# Patient Record
Sex: FEMALE | Race: WHITE | NOT HISPANIC OR LATINO | Employment: STUDENT | URBAN - METROPOLITAN AREA
[De-identification: names, ages, dates, MRNs, and addresses within clinical notes are randomized per-mention and may not be internally consistent; named-entity substitution may affect disease eponyms.]

---

## 2024-06-19 ENCOUNTER — HOSPITAL ENCOUNTER (EMERGENCY)
Facility: CLINIC | Age: 19
Discharge: OTHER INSTITUTION | End: 2024-06-20
Attending: EMERGENCY MEDICINE | Admitting: EMERGENCY MEDICINE
Payer: COMMERCIAL

## 2024-06-19 ENCOUNTER — APPOINTMENT (OUTPATIENT)
Dept: GENERAL RADIOLOGY | Facility: CLINIC | Age: 19
End: 2024-06-19
Attending: EMERGENCY MEDICINE
Payer: COMMERCIAL

## 2024-06-19 DIAGNOSIS — R63.8 DECREASED ORAL INTAKE: ICD-10-CM

## 2024-06-19 DIAGNOSIS — R07.9 CHEST PAIN, UNSPECIFIED TYPE: ICD-10-CM

## 2024-06-19 DIAGNOSIS — R06.00 DYSPNEA, UNSPECIFIED TYPE: ICD-10-CM

## 2024-06-19 DIAGNOSIS — F41.9 SEVERE ANXIETY: ICD-10-CM

## 2024-06-19 PROBLEM — F43.10 PTSD (POST-TRAUMATIC STRESS DISORDER): Status: ACTIVE | Noted: 2024-06-19

## 2024-06-19 PROBLEM — F41.1 GAD (GENERALIZED ANXIETY DISORDER): Status: ACTIVE | Noted: 2024-06-19

## 2024-06-19 LAB
ANION GAP SERPL CALCULATED.3IONS-SCNC: 16 MMOL/L (ref 7–15)
BASOPHILS # BLD AUTO: 0.1 10E3/UL (ref 0–0.2)
BASOPHILS NFR BLD AUTO: 1 %
BUN SERPL-MCNC: 11.8 MG/DL (ref 6–20)
CALCIUM SERPL-MCNC: 10.2 MG/DL (ref 8.6–10)
CHLORIDE SERPL-SCNC: 101 MMOL/L (ref 98–107)
CREAT SERPL-MCNC: 0.86 MG/DL (ref 0.51–0.95)
D DIMER PPP FEU-MCNC: 0.33 UG/ML FEU (ref 0–0.5)
DEPRECATED HCO3 PLAS-SCNC: 20 MMOL/L (ref 22–29)
EGFRCR SERPLBLD CKD-EPI 2021: >90 ML/MIN/1.73M2
EOSINOPHIL # BLD AUTO: 0 10E3/UL (ref 0–0.7)
EOSINOPHIL NFR BLD AUTO: 0 %
ERYTHROCYTE [DISTWIDTH] IN BLOOD BY AUTOMATED COUNT: 11.9 % (ref 10–15)
GLUCOSE SERPL-MCNC: 82 MG/DL (ref 70–99)
HCG SER QL IA.RAPID: NEGATIVE
HCT VFR BLD AUTO: 47.1 % (ref 35–47)
HGB BLD-MCNC: 16 G/DL (ref 11.7–15.7)
IMM GRANULOCYTES # BLD: 0 10E3/UL
IMM GRANULOCYTES NFR BLD: 0 %
LYMPHOCYTES # BLD AUTO: 1.9 10E3/UL (ref 0.8–5.3)
LYMPHOCYTES NFR BLD AUTO: 26 %
MAGNESIUM SERPL-MCNC: 2.2 MG/DL (ref 1.7–2.3)
MCH RBC QN AUTO: 30.4 PG (ref 26.5–33)
MCHC RBC AUTO-ENTMCNC: 34 G/DL (ref 31.5–36.5)
MCV RBC AUTO: 90 FL (ref 78–100)
MONOCYTES # BLD AUTO: 0.4 10E3/UL (ref 0–1.3)
MONOCYTES NFR BLD AUTO: 5 %
NEUTROPHILS # BLD AUTO: 5 10E3/UL (ref 1.6–8.3)
NEUTROPHILS NFR BLD AUTO: 68 %
NRBC # BLD AUTO: 0 10E3/UL
NRBC BLD AUTO-RTO: 0 /100
NT-PROBNP SERPL-MCNC: <36 PG/ML (ref 0–450)
PLATELET # BLD AUTO: 275 10E3/UL (ref 150–450)
POTASSIUM SERPL-SCNC: 3.6 MMOL/L (ref 3.4–5.3)
RBC # BLD AUTO: 5.26 10E6/UL (ref 3.8–5.2)
SARS-COV-2 RNA RESP QL NAA+PROBE: NEGATIVE
SODIUM SERPL-SCNC: 137 MMOL/L (ref 135–145)
TROPONIN T SERPL HS-MCNC: <6 NG/L
TSH SERPL DL<=0.005 MIU/L-ACNC: 1.63 UIU/ML (ref 0.5–4.3)
WBC # BLD AUTO: 7.4 10E3/UL (ref 4–11)

## 2024-06-19 PROCEDURE — 85379 FIBRIN DEGRADATION QUANT: CPT | Performed by: EMERGENCY MEDICINE

## 2024-06-19 PROCEDURE — 80048 BASIC METABOLIC PNL TOTAL CA: CPT | Performed by: EMERGENCY MEDICINE

## 2024-06-19 PROCEDURE — 84703 CHORIONIC GONADOTROPIN ASSAY: CPT

## 2024-06-19 PROCEDURE — 83880 ASSAY OF NATRIURETIC PEPTIDE: CPT | Performed by: EMERGENCY MEDICINE

## 2024-06-19 PROCEDURE — 93005 ELECTROCARDIOGRAM TRACING: CPT | Mod: 76

## 2024-06-19 PROCEDURE — 84443 ASSAY THYROID STIM HORMONE: CPT | Performed by: EMERGENCY MEDICINE

## 2024-06-19 PROCEDURE — 250N000011 HC RX IP 250 OP 636: Mod: JZ | Performed by: EMERGENCY MEDICINE

## 2024-06-19 PROCEDURE — 96361 HYDRATE IV INFUSION ADD-ON: CPT

## 2024-06-19 PROCEDURE — 93005 ELECTROCARDIOGRAM TRACING: CPT

## 2024-06-19 PROCEDURE — 80307 DRUG TEST PRSMV CHEM ANLYZR: CPT | Performed by: EMERGENCY MEDICINE

## 2024-06-19 PROCEDURE — 258N000003 HC RX IP 258 OP 636: Mod: JZ | Performed by: EMERGENCY MEDICINE

## 2024-06-19 PROCEDURE — 99285 EMERGENCY DEPT VISIT HI MDM: CPT | Mod: 25

## 2024-06-19 PROCEDURE — 84484 ASSAY OF TROPONIN QUANT: CPT | Performed by: EMERGENCY MEDICINE

## 2024-06-19 PROCEDURE — 71046 X-RAY EXAM CHEST 2 VIEWS: CPT

## 2024-06-19 PROCEDURE — 83735 ASSAY OF MAGNESIUM: CPT | Performed by: EMERGENCY MEDICINE

## 2024-06-19 PROCEDURE — 87635 SARS-COV-2 COVID-19 AMP PRB: CPT | Performed by: EMERGENCY MEDICINE

## 2024-06-19 PROCEDURE — 36415 COLL VENOUS BLD VENIPUNCTURE: CPT | Performed by: EMERGENCY MEDICINE

## 2024-06-19 PROCEDURE — 85025 COMPLETE CBC W/AUTO DIFF WBC: CPT | Performed by: EMERGENCY MEDICINE

## 2024-06-19 PROCEDURE — 96374 THER/PROPH/DIAG INJ IV PUSH: CPT

## 2024-06-19 RX ORDER — LORAZEPAM 2 MG/ML
0.5 INJECTION INTRAMUSCULAR ONCE
Status: COMPLETED | OUTPATIENT
Start: 2024-06-19 | End: 2024-06-19

## 2024-06-19 RX ADMIN — LORAZEPAM 0.5 MG: 2 INJECTION INTRAMUSCULAR; INTRAVENOUS at 21:51

## 2024-06-19 RX ADMIN — SODIUM CHLORIDE 1000 ML: 9 INJECTION, SOLUTION INTRAVENOUS at 19:48

## 2024-06-19 ASSESSMENT — ACTIVITIES OF DAILY LIVING (ADL)
ADLS_ACUITY_SCORE: 35

## 2024-06-19 ASSESSMENT — COLUMBIA-SUICIDE SEVERITY RATING SCALE - C-SSRS
2. HAVE YOU ACTUALLY HAD ANY THOUGHTS OF KILLING YOURSELF IN THE PAST MONTH?: NO
1. IN THE PAST MONTH, HAVE YOU WISHED YOU WERE DEAD OR WISHED YOU COULD GO TO SLEEP AND NOT WAKE UP?: NO

## 2024-06-19 NOTE — ED TRIAGE NOTES
Pt arrives with father from clinic. She reports severe panic attacks/anxiety for the past 5 days. Hx anxiety and PTSD. Pt reports being unable to eat for last 5 days due to anxiety. She reports here face and hands feel kind of numb. Tachycardic in triage. She reports ativan helped last time.

## 2024-06-20 ENCOUNTER — TELEPHONE (OUTPATIENT)
Dept: BEHAVIORAL HEALTH | Facility: CLINIC | Age: 19
End: 2024-06-20
Payer: COMMERCIAL

## 2024-06-20 ENCOUNTER — HOSPITAL ENCOUNTER (INPATIENT)
Facility: CLINIC | Age: 19
LOS: 4 days | Discharge: HOME | End: 2024-06-24
Attending: PSYCHIATRY & NEUROLOGY | Admitting: PSYCHIATRY & NEUROLOGY
Payer: COMMERCIAL

## 2024-06-20 VITALS
SYSTOLIC BLOOD PRESSURE: 115 MMHG | DIASTOLIC BLOOD PRESSURE: 94 MMHG | WEIGHT: 97.88 LBS | HEART RATE: 121 BPM | TEMPERATURE: 98.9 F | BODY MASS INDEX: 19.22 KG/M2 | HEIGHT: 60 IN | RESPIRATION RATE: 16 BRPM | OXYGEN SATURATION: 99 %

## 2024-06-20 DIAGNOSIS — R11.0 NAUSEA: ICD-10-CM

## 2024-06-20 DIAGNOSIS — F41.1 GAD (GENERALIZED ANXIETY DISORDER): Primary | ICD-10-CM

## 2024-06-20 PROBLEM — F33.2 MAJOR DEPRESSIVE DISORDER, RECURRENT SEVERE WITHOUT PSYCHOTIC FEATURES (H): Status: ACTIVE | Noted: 2024-06-20

## 2024-06-20 LAB
AMPHETAMINES UR QL SCN: ABNORMAL
ATRIAL RATE - MUSE: 104 BPM
ATRIAL RATE - MUSE: 125 BPM
BARBITURATES UR QL SCN: ABNORMAL
BENZODIAZ UR QL SCN: ABNORMAL
BZE UR QL SCN: ABNORMAL
CANNABINOIDS UR QL SCN: ABNORMAL
DIASTOLIC BLOOD PRESSURE - MUSE: NORMAL MMHG
DIASTOLIC BLOOD PRESSURE - MUSE: NORMAL MMHG
FENTANYL UR QL: ABNORMAL
INTERPRETATION ECG - MUSE: NORMAL
INTERPRETATION ECG - MUSE: NORMAL
OPIATES UR QL SCN: ABNORMAL
P AXIS - MUSE: 80 DEGREES
P AXIS - MUSE: 80 DEGREES
PCP QUAL URINE (ROCHE): ABNORMAL
PR INTERVAL - MUSE: 112 MS
PR INTERVAL - MUSE: 116 MS
QRS DURATION - MUSE: 82 MS
QRS DURATION - MUSE: 84 MS
QT - MUSE: 328 MS
QT - MUSE: 332 MS
QTC - MUSE: 427 MS
QTC - MUSE: 436 MS
R AXIS - MUSE: 92 DEGREES
R AXIS - MUSE: 94 DEGREES
SYSTOLIC BLOOD PRESSURE - MUSE: NORMAL MMHG
SYSTOLIC BLOOD PRESSURE - MUSE: NORMAL MMHG
T AXIS - MUSE: 39 DEGREES
T AXIS - MUSE: 42 DEGREES
VENTRICULAR RATE- MUSE: 102 BPM
VENTRICULAR RATE- MUSE: 104 BPM

## 2024-06-20 PROCEDURE — 128N000002 HC R&B CD/MH ADOLESCENT

## 2024-06-20 PROCEDURE — 96376 TX/PRO/DX INJ SAME DRUG ADON: CPT

## 2024-06-20 PROCEDURE — 250N000011 HC RX IP 250 OP 636: Performed by: EMERGENCY MEDICINE

## 2024-06-20 PROCEDURE — 250N000013 HC RX MED GY IP 250 OP 250 PS 637: Performed by: PSYCHIATRY & NEUROLOGY

## 2024-06-20 PROCEDURE — 250N000013 HC RX MED GY IP 250 OP 250 PS 637: Performed by: EMERGENCY MEDICINE

## 2024-06-20 RX ORDER — OLANZAPINE 10 MG/2ML
10 INJECTION, POWDER, FOR SOLUTION INTRAMUSCULAR 3 TIMES DAILY PRN
Status: DISCONTINUED | OUTPATIENT
Start: 2024-06-20 | End: 2024-06-21

## 2024-06-20 RX ORDER — OLANZAPINE 10 MG/1
10 TABLET ORAL 3 TIMES DAILY PRN
Status: CANCELLED | OUTPATIENT
Start: 2024-06-20

## 2024-06-20 RX ORDER — MAGNESIUM HYDROXIDE/ALUMINUM HYDROXICE/SIMETHICONE 120; 1200; 1200 MG/30ML; MG/30ML; MG/30ML
30 SUSPENSION ORAL EVERY 4 HOURS PRN
Status: CANCELLED | OUTPATIENT
Start: 2024-06-20

## 2024-06-20 RX ORDER — HYDROXYZINE HYDROCHLORIDE 25 MG/1
25 TABLET, FILM COATED ORAL EVERY 4 HOURS PRN
Status: CANCELLED | OUTPATIENT
Start: 2024-06-20

## 2024-06-20 RX ORDER — HYDROXYZINE HCL 10 MG/5 ML
25-50 SOLUTION, ORAL ORAL ONCE
Status: COMPLETED | OUTPATIENT
Start: 2024-06-20 | End: 2024-06-20

## 2024-06-20 RX ORDER — HYDROXYZINE HYDROCHLORIDE 25 MG/1
25 TABLET, FILM COATED ORAL EVERY 4 HOURS PRN
Status: DISCONTINUED | OUTPATIENT
Start: 2024-06-20 | End: 2024-06-21

## 2024-06-20 RX ORDER — ONDANSETRON 4 MG/1
4 TABLET, ORALLY DISINTEGRATING ORAL EVERY 6 HOURS PRN
Status: CANCELLED | OUTPATIENT
Start: 2024-06-20

## 2024-06-20 RX ORDER — LOPERAMIDE HCL 2 MG
2 CAPSULE ORAL 4 TIMES DAILY PRN
Status: CANCELLED | OUTPATIENT
Start: 2024-06-20

## 2024-06-20 RX ORDER — TRAZODONE HYDROCHLORIDE 50 MG/1
50 TABLET, FILM COATED ORAL
Status: CANCELLED | OUTPATIENT
Start: 2024-06-20

## 2024-06-20 RX ORDER — MAGNESIUM HYDROXIDE/ALUMINUM HYDROXICE/SIMETHICONE 120; 1200; 1200 MG/30ML; MG/30ML; MG/30ML
30 SUSPENSION ORAL EVERY 4 HOURS PRN
Status: DISCONTINUED | OUTPATIENT
Start: 2024-06-20 | End: 2024-06-24 | Stop reason: HOSPADM

## 2024-06-20 RX ORDER — AMOXICILLIN 250 MG
1 CAPSULE ORAL 2 TIMES DAILY PRN
Status: CANCELLED | OUTPATIENT
Start: 2024-06-20

## 2024-06-20 RX ORDER — TRAZODONE HYDROCHLORIDE 50 MG/1
50 TABLET, FILM COATED ORAL
Status: DISCONTINUED | OUTPATIENT
Start: 2024-06-20 | End: 2024-06-24 | Stop reason: HOSPADM

## 2024-06-20 RX ORDER — OLANZAPINE 10 MG/2ML
10 INJECTION, POWDER, FOR SOLUTION INTRAMUSCULAR 3 TIMES DAILY PRN
Status: CANCELLED | OUTPATIENT
Start: 2024-06-20

## 2024-06-20 RX ORDER — CLONIDINE HYDROCHLORIDE 0.1 MG/1
0.1 TABLET ORAL EVERY 6 HOURS PRN
Status: CANCELLED | OUTPATIENT
Start: 2024-06-20

## 2024-06-20 RX ORDER — ACETAMINOPHEN 325 MG/1
650 TABLET ORAL EVERY 4 HOURS PRN
Status: DISCONTINUED | OUTPATIENT
Start: 2024-06-20 | End: 2024-06-24 | Stop reason: HOSPADM

## 2024-06-20 RX ORDER — ACETAMINOPHEN 325 MG/1
650 TABLET ORAL EVERY 4 HOURS PRN
Status: CANCELLED | OUTPATIENT
Start: 2024-06-20

## 2024-06-20 RX ORDER — LORAZEPAM 2 MG/ML
0.5 INJECTION INTRAMUSCULAR ONCE
Status: COMPLETED | OUTPATIENT
Start: 2024-06-20 | End: 2024-06-20

## 2024-06-20 RX ORDER — AMOXICILLIN 250 MG
1 CAPSULE ORAL 2 TIMES DAILY PRN
Status: DISCONTINUED | OUTPATIENT
Start: 2024-06-20 | End: 2024-06-24 | Stop reason: HOSPADM

## 2024-06-20 RX ORDER — OLANZAPINE 10 MG/1
10 TABLET ORAL 3 TIMES DAILY PRN
Status: DISCONTINUED | OUTPATIENT
Start: 2024-06-20 | End: 2024-06-21

## 2024-06-20 RX ADMIN — HYDROXYZINE HYDROCHLORIDE 25 MG: 25 TABLET, FILM COATED ORAL at 20:22

## 2024-06-20 RX ADMIN — LORAZEPAM 0.5 MG: 2 INJECTION INTRAMUSCULAR; INTRAVENOUS at 00:14

## 2024-06-20 RX ADMIN — HYDROXYZINE HYDROCHLORIDE 50 MG: 10 SYRUP ORAL at 13:52

## 2024-06-20 ASSESSMENT — ACTIVITIES OF DAILY LIVING (ADL)
ADLS_ACUITY_SCORE: 28
ADLS_ACUITY_SCORE: 35
ADLS_ACUITY_SCORE: 28
ADLS_ACUITY_SCORE: 35
ADLS_ACUITY_SCORE: 28
ADLS_ACUITY_SCORE: 35
ADLS_ACUITY_SCORE: 28
ADLS_ACUITY_SCORE: 35
ADLS_ACUITY_SCORE: 35

## 2024-06-20 NOTE — ED NOTES
IP MH Referral Acuity Rating Score (RARS)    LMHP complete at referral to IP MH, with DEC; and, daily while awaiting IP MH placement. Call score to PPS.  CRITERIA SCORING   New 72 HH and Involuntary for IP MH (not adolescent) 0/1   Boarding over 24 hours 0/1   Vulnerable adult at least 55+ with multiple co morbidities; or, Patient age 11 or under 0/1   Suicide ideation without relief of precipitating factors 0/1   Current plan for suicide 0/1   Current plan for homicide 0/1   Imminent risk or actual attempt to seriously harm another without relief of factors precipitating the attempt 0/1   Severe dysfunction in daily living (ex: complete neglect for self care, extreme disruption in vegetative function, extreme deterioration in social interactions) 1/1   Recent (last 2 weeks) or current physical aggression in the ED 0/1   Restraints or seclusion episode in ED 0/1   Verbal aggression, agitation, yelling, etc., while in the ED 0/1   Active psychosis with psychomotor agitation or catatonia 0/1   Need for constant or near constant redirection (from leaving, from others, etc).  0/1   Intrusive or disruptive behaviors 0/1   TOTAL Acuity Total Score: 1       agree agree agree agree agree

## 2024-06-20 NOTE — TELEPHONE ENCOUNTER
2:12am - Intake called CRN to review pt for possible placement to 6A/YA unit    2:50am - CRN called back and was wondering about the IV Ativan medications given earlier and whether they were forced medications or patient's preferred way of receiving medications. Intake will call [facility] ED to gain information and call 6A with update.    3:05am - Intake called and spoke with MARY ELLEN Hgoan at [facility] and she stated pt preferred the IV of Ativan and that these medications were not given by force. Pt is medication compliant by IV only as she has high anxiety when taking pills orally in her past. Pt father at bedside also stated to RN that pt was compliant and wanted IV medications. Intake will update CRN on 6A.    3:16am - INtake updated 6A CRN about information above. CRN agrees pt meets criteria for the unit and Intake will page the provider for acceptance.     3:30am - Paged [facility] for possible placement to [address] for [facility]    3:41am - Dr. Alvarez accepts pt for [facility]    3:46am - Notified unit of pt placement. Pt will be admitting on next shift due to a discharge for the open bed.     3:53am - Notified MARY ELLEN Hogan of pt acceptance and placement. RN aware of pt waiting for discharge on unit before admitting.     Any Completed S.R    R: Patient Placement to [address]/Dr. Hogan

## 2024-06-20 NOTE — TELEPHONE ENCOUNTER
R:  [place]  Access Inpatient Bed Call Log 6/20/2024  @ 7:30 AM   Intake has called facilities that have not updated their bed status within the last 12 hours.       [facility] is posting 0 beds.               [facility] is posting 3 beds. [phone]    [place] is posting 0 beds. [phone]               [facility] is posting 0 bed. [phone]; Per call at 7:39am to [facility] no high acuity but poss a few low acuity beds.   [facility] is posting 0 beds. [phone]    [facility] (These are Young Adult beds, 18-35) is posting 2 bed. Negative COVID test required, no recent or significant aggression, violence, or sexual assault. [phone]; Per call at 7:40am to [facility], adol, and child beds are available, however, no toby beds.   [facility] is posting 0 beds. [phone]          [facility] is posting 0 beds. [phone] Per Mary @ 10:25 AM metro @ cap until tomorrow at- 8AM; regional @ cap until 11am today, call back         [facility] is posting 4 beds. Mixed unit 12+. Low acuity only. HP DIRECT: [phone]      [facility] is posting 2 bed. No aggression.  [phone]    Per Mary @ 10:25 AM metro @ cap until tomorrow at- 8AM; regional @ cap until 11am today, call back   [facility] is posting 0 beds. [phone];    [facility] is posting 0 beds. Low acuity only. [phone] Per call at 7:42am to [facility] no beds available.   [facility] is posting 0 beds. Low acuity. No current aggression. [phone]    [facility] is posting 0 beds. Low acuity. [phone]   [facility] is posting 1 bed. 72 HH preferred. Low acuity. [phone]; Per call at 7:47am to [place] beds are available.   [facility] is posting 3 beds. Low acuity. [phone]; Per call at 7:42am to [facility], 1 low acuity bed available.         Pt remains on work list pending appropriate bed availability.        10:13 AM According to [facility],  the discharge that was supposed to open private room for pt to admit to was canceled. Called 6A to confirm with MARK Marinelli. Yves states pt can remain in queue as he may have another discharge later today.   10:18 AM Called [facility] and updated on admission barriers.   11:20 AM Per call to [facility], they are reviewing for all open beds at the moment, suggested calling back later.   11:22 AM Per call to Concepcion at [facility], can fax clinical for review.  11:32 AM Clinical faxed to [facility] for review.   12:17 PM Called LIN Marinelli to confirm posted discharge. Yves state that discharge should open a room to accommodate admission. It will not be until next shift. He states [facility] can call for report at 4PM.   12:20 PM Called [facility] to update.   5:37 PM Called [facility] Marinelli for update. He states pt should be en route for admission.

## 2024-06-20 NOTE — ED PROVIDER NOTES
Over care of the patient at handoff.  Patient is waiting for inpatient placement for anxiety and PTSD.  She is voluntary but holdable.  She is agreeable to inpatient placement.  Patient is feeling a little bit anxious, she is given another dose of IV Ativan.    Patient is found placement at [facility].  Patient is awaiting transfer.     Linda Vasquez MD  06/21/24 5768

## 2024-06-20 NOTE — ED PROVIDER NOTES
Emergency Department Note      History of Present Illness     Chief Complaint  Panic Attack    HPI  Janice Cox is a 18 year old female with a history of anxiety and PTSD who presents to the emergency department with concerns for intractable anxiety with reduced oral intake, intermittent chest pain and shortness of breath.  She notes she cannot take her oral anxiety medications due to anxiety and PTSD related to bad reactions from past medications.  She notes she is still been urinating.  She denies abdominal pain.  She denies fever or chills.  She notes similar symptoms with past anxiety and panic attacks, however reports she has never had evaluation for this aside from an examination.  Her father reports she was sent here after visit from her primary care provider earlier today with regards for needing admission for inpatient psychiatric care.    She denies feeling suicidal.    Independent Historian  Father as detailed above.    Review of External Notes  I reviewed [facility] note from 6/8/2024 patient was seen for nausea with vomiting that provoked a panic attack.  Past Medical History   Medical History and Problem List  Anxiety  Panic attack  PTSD    Medications  Denies daily medications currently    Surgical History   No past surgical history on file.  Physical Exam   Patient Vitals for the past 24 hrs:   BP Temp Temp src Pulse Resp SpO2 Height Weight   06/19/24 2237 -- -- -- -- -- 96 % -- --   06/19/24 2236 -- -- -- -- -- 96 % -- --   06/19/24 2235 -- -- -- -- -- 96 % -- --   06/19/24 2234 -- -- -- -- -- 97 % -- --   06/19/24 2233 -- -- -- -- -- 97 % -- --   06/19/24 2232 -- -- -- -- -- 98 % -- --   06/19/24 2231 -- -- -- -- -- 97 % -- --   06/19/24 2230 133/85 -- -- 91 -- 97 % -- --   06/19/24 2229 -- -- -- -- -- 97 % -- --   06/19/24 2228 -- -- -- -- -- 99 % -- --   06/19/24 2220 -- -- -- -- -- 97 % -- --   06/19/24 2219 -- -- -- -- -- 98 % -- --   06/19/24 2218 -- -- -- -- -- 98 % -- --   06/19/24 2217  -- -- -- -- -- 98 % -- --   06/19/24 2216 -- -- -- -- -- 98 % -- --   06/19/24 2215 -- -- -- -- -- 98 % -- --   06/19/24 1827 (!) 122/103 98.9  F (37.2  C) Temporal (!) 121 20 100 % 1.524 m (5') 44.4 kg (97 lb 14.2 oz)     Physical Exam  General: Adult sitting upright  Eyes: PERRL, Conjunctive within normal limits.  No scleral icterus.  ENT: Moist mucous membranes, oropharynx clear.   CV: Normal S1S2, no murmur, rub or gallop.  Tachycardic, regular.  Resp: Clear to auscultation bilaterally, no wheezes, rales or rhonchi. Normal respiratory effort.  GI: Abdomen is soft, nontender and nondistended. No palpable masses. No rebound or guarding.  MSK: No edema. Nontender. Normal active range of motion.  Skin: Warm and dry. No rashes or lesions or ecchymoses on visible skin.  Neuro: Alert and oriented. Responds appropriately to all questions and commands. No focal findings appreciated. Normal muscle tone.  Psych: Rapid speech.  Anxious appearing.  Teary at times.  Diagnostics   Lab Results   Labs Ordered and Resulted from Time of ED Arrival to Time of ED Departure   BASIC METABOLIC PANEL - Abnormal       Result Value    Sodium 137      Potassium 3.6      Chloride 101      Carbon Dioxide (CO2) 20 (*)     Anion Gap 16 (*)     Urea Nitrogen 11.8      Creatinine 0.86      GFR Estimate >90      Calcium 10.2 (*)     Glucose 82     CBC WITH PLATELETS AND DIFFERENTIAL - Abnormal    WBC Count 7.4      RBC Count 5.26 (*)     Hemoglobin 16.0 (*)     Hematocrit 47.1 (*)     MCV 90      MCH 30.4      MCHC 34.0      RDW 11.9      Platelet Count 275      % Neutrophils 68      % Lymphocytes 26      % Monocytes 5      % Eosinophils 0      % Basophils 1      % Immature Granulocytes 0      NRBCs per 100 WBC 0      Absolute Neutrophils 5.0      Absolute Lymphocytes 1.9      Absolute Monocytes 0.4      Absolute Eosinophils 0.0      Absolute Basophils 0.1      Absolute Immature Granulocytes 0.0      Absolute NRBCs 0.0     TSH WITH FREE T4 REFLEX  - Normal    TSH 1.63     MAGNESIUM - Normal    Magnesium 2.2     D DIMER QUANTITATIVE - Normal    D-Dimer Quantitative 0.33     TROPONIN T, HIGH SENSITIVITY - Normal    Troponin T, High Sensitivity <6     NT PROBNP INPATIENT - Normal    N terminal Pro BNP Inpatient <36     ISTAT HCG QUALITATIVE PREGNANCY POCT - Normal    HCG Qualitative POCT Negative     COVID-19 VIRUS (CORONAVIRUS) BY PCR       Imaging  Chest XR,  PA & LAT   Final Result   IMPRESSION: No acute airspace consolidation. No pleural effusion or pneumothorax.      Cardiomediastinal silhouette is normal.          EKG     ECG results from 06/19/24   EKG 12-lead, tracing only     Value    Systolic Blood Pressure     Diastolic Blood Pressure     Ventricular Rate 102    Atrial Rate 125    OH Interval 116    QRS Duration 82        QTc 427    P Axis 80    R AXIS 94    T Axis 42    Interpretation ECG      Sinus tachycardia  Right atrial enlargement  Rightward axis  Pulmonary disease pattern  Nonspecific ST abnormality  Abnormal ECG  No previous ECGs available     EKG 12 lead     Value    Systolic Blood Pressure     Diastolic Blood Pressure     Ventricular Rate 104    Atrial Rate 104    OH Interval 112    QRS Duration 84        QTc 436    P Axis 80    R AXIS 92    T Axis 39    Interpretation ECG      Sinus tachycardia  Right atrial enlargement  Rightward axis  Pulmonary disease pattern  Nonspecific ST abnormality  Abnormal ECG  When compared with ECG of 19-JUN-2024 18:58, (unconfirmed)  No significant change was found        Independent Interpretation  None  ED Course    Medications Administered  Medications   sodium chloride 0.9% BOLUS 1,000 mL (1,000 mLs Intravenous $New Bag 6/19/24 1948)   LORazepam (ATIVAN) injection 0.5 mg (0.5 mg Intravenous $Given 6/19/24 2151)       Discussion of Management  DEC consultLaura of DEC notes that the patient at this time has severe anxiety that is intractable and would benefit from inpatient psychiatric care.    Will plan to reassess in the morning to see if reassessment offers new information.    Social Determinants of Health adding to complexity of care  None    ED Course  Past medical records, nursing notes, and vitals reviewed.  I performed an exam of the patient and obtained history, as documented above.   I reassessed the patient discussed findings of today's evaluation and recommendations.  She and her father are agreeable with this plan.    Medical Decision Making / Diagnosis     OSCAR Cox is a 18 year old female who presents with reported concerns for anxiety and panic attacks that are intractable.  She has had poor and oral intake as a result and has a fear of taking oral medications related to past bad experiences.  She is not currently suicidal.  She has some medical concerns which could be linked to anxiety however evaluation is undertaking to make organic cause less likely.  She is tachycardic on arrival.  She has EKG changes which although abnormal were similar to past ECGs at recent visits.  Troponin, D-dimer, and BNP are normal here making acute cardiopulmonary cause of her symptoms less likely.  Chest x-ray not show acute pathology.  Her labs are otherwise reassuring with slight acidosis likely secondary to hyperventilation.  DEC assessed the patient and felt she met criteria at this time for inpatient psychiatric care given the intractable nature of her anxiety and panic with difficulty controlling symptoms at home.  She is voluntary.  DEC notes they will reassess her in the morning to reassess the situation    Disposition  Care of the patient was transferred to my colleague Dr. Eaton pending reassessment in the morning by DEC with current plan for admission to inpatient psychiatric care    ICD-10 Codes:    ICD-10-CM    1. Severe anxiety  F41.9       2. Decreased oral intake  R63.8       3. Chest pain, unspecified type  R07.9       4. Dyspnea, unspecified type  R06.00            Yandy CHACON  MD Rama Ontiveros Tracy Dianne, MD  06/19/24 0344

## 2024-06-20 NOTE — ED NOTES
"Pt states she is feeling \"better and I was able to eat some crackers\".  Pt resting in bed awake.   Pt's respirations are WDL.  Brought pt more crackers.   "

## 2024-06-20 NOTE — PLAN OF CARE
Janice Cox  June 19, 2024  Plan of Care Hand-off Note     Patient Care Path: inpatient mental health    Plan for Care:   After therapeutic assessment, intervention and aftercare planning by ED care team and LM and in consultation with attending provider, the patient's circumstances and mental state were appropriate for outpatient management. It is the recommendation of this clinician that admit to inpatient psychiatric care for safety and stabilization. Patient has presented to the emergency department multiple times for her debilitating anxiety. Patient was seen by her primary care provider today for her anxiety symptoms which have reached a level where she is not able to function in the community, patient's primary care provider sent patient to the emergency room for an admission as patient is not able to access the level of services as an outpatient basis at this time. Patient is struggling to eat, sleep or function, patient is having multiple panic attacks daily for the past 5 days. Patient is anxious and fearful about taking medication, patient is unable to get herself to take medication due to fear of the medication killing her or causing her to have a brain tumor. Patient is unable to self regulate, calm down time. Once patient is able to function and is more stable this provider would recommend patient attend a day treatment program once discharged.    Identified Goals and Safety Issues: Admit to inpatient psych for safety and stabilization, once patient is able to discharge this  would recommend patient attend a day treatment program.  Patient does have a first time therapy appointment set up for July 5.    Overview:  Nabil Cox (Father)  [phone]      Legal Status: Legal Status at Admission: Voluntary/Patient has signed consent for treatment    Psychiatry Consult: LIZBET Ontiveros  regarding plan of care.           KURT SellersSW
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
## 2024-06-20 NOTE — ED NOTES
"Writer encountered pt on her way back from the bathroom. She is tachypneic and occasionally gasping, saying \"I'm scared, I feel so anxious\".  She is slouching and taking short steps.  Helped pt back to her room with standby assist and into bed.  Reassured patient and contacting MD.   "

## 2024-06-20 NOTE — ED NOTES
Spoke with [facility] about transfer to [facility].  Per [facility] the unit no longer has a bed opening until this afternoon.

## 2024-06-20 NOTE — ED NOTES
"Pt is tearful and appears anxious, stating \"I'm scared\".  Reassured pt, tried distraction techniques, and coached pt through taking liquid medication. After taking medication, pt lies back down in bed.   "

## 2024-06-20 NOTE — ED PROVIDER NOTES
This 18 year old female patient was under my care during my shift from 0600 to 1500 on 06/20/24.  She is in the emergency department awaiting psychiatric bed placement on a voluntary basis for severe anxiety. Medically cleared. I did not meet with the patient but at 4421 I was notified the patient was having increased anxiety.  She has an aversion to pills so we will give her liquid Atarax.  She is not appropriate to be getting further IV benzodiazepines and I have asked the nurse to remove her IV.  This is not medically necessary nor are IV benzodiazepines a long term treatment solution. At the end of my shift her care was transferred to my partner, Dr. Abdalla.     Sylwia Quinonez MD  06/22/24 3186

## 2024-06-21 PROCEDURE — G0177 OPPS/PHP; TRAIN & EDUC SERV: HCPCS

## 2024-06-21 PROCEDURE — 128N000002 HC R&B CD/MH ADOLESCENT

## 2024-06-21 PROCEDURE — 250N000013 HC RX MED GY IP 250 OP 250 PS 637: Performed by: PSYCHIATRY & NEUROLOGY

## 2024-06-21 PROCEDURE — 99222 1ST HOSP IP/OBS MODERATE 55: CPT | Performed by: CLINICAL NURSE SPECIALIST

## 2024-06-21 PROCEDURE — 250N000013 HC RX MED GY IP 250 OP 250 PS 637: Performed by: CLINICAL NURSE SPECIALIST

## 2024-06-21 RX ORDER — OLANZAPINE 2.5 MG/1
2.5-5 TABLET, FILM COATED ORAL 3 TIMES DAILY PRN
Status: DISCONTINUED | OUTPATIENT
Start: 2024-06-21 | End: 2024-06-24 | Stop reason: HOSPADM

## 2024-06-21 RX ORDER — ONDANSETRON 4 MG/1
4 TABLET, ORALLY DISINTEGRATING ORAL EVERY 8 HOURS PRN
Status: ON HOLD | COMMUNITY
End: 2024-06-24

## 2024-06-21 RX ORDER — ONDANSETRON 4 MG/1
4 TABLET, ORALLY DISINTEGRATING ORAL EVERY 6 HOURS PRN
Qty: 30 TABLET | Refills: 0 | Status: SHIPPED | OUTPATIENT
Start: 2024-06-21

## 2024-06-21 RX ORDER — ESCITALOPRAM OXALATE 5 MG/1
5 TABLET ORAL DAILY
Qty: 30 TABLET | Refills: 1 | Status: SHIPPED | OUTPATIENT
Start: 2024-06-22

## 2024-06-21 RX ORDER — HYDROXYZINE HYDROCHLORIDE 25 MG/1
25-50 TABLET, FILM COATED ORAL EVERY 4 HOURS PRN
Status: DISCONTINUED | OUTPATIENT
Start: 2024-06-21 | End: 2024-06-24 | Stop reason: HOSPADM

## 2024-06-21 RX ORDER — ADAPALENE GEL USP, 0.3% 3 MG/G
GEL TOPICAL
COMMUNITY

## 2024-06-21 RX ORDER — OLANZAPINE 10 MG/2ML
5 INJECTION, POWDER, FOR SOLUTION INTRAMUSCULAR 3 TIMES DAILY PRN
Status: DISCONTINUED | OUTPATIENT
Start: 2024-06-21 | End: 2024-06-24 | Stop reason: HOSPADM

## 2024-06-21 RX ORDER — LORAZEPAM 0.5 MG/1
0.5 TABLET ORAL 2 TIMES DAILY PRN
Status: DISCONTINUED | OUTPATIENT
Start: 2024-06-21 | End: 2024-06-24 | Stop reason: HOSPADM

## 2024-06-21 RX ORDER — TRIAMCINOLONE ACETONIDE 1 MG/G
CREAM TOPICAL 2 TIMES DAILY PRN
COMMUNITY

## 2024-06-21 RX ORDER — ESCITALOPRAM OXALATE 5 MG/1
5 TABLET ORAL DAILY
Status: DISCONTINUED | OUTPATIENT
Start: 2024-06-21 | End: 2024-06-24 | Stop reason: HOSPADM

## 2024-06-21 RX ORDER — ONDANSETRON 4 MG/1
4 TABLET, ORALLY DISINTEGRATING ORAL EVERY 6 HOURS PRN
Status: DISCONTINUED | OUTPATIENT
Start: 2024-06-21 | End: 2024-06-24 | Stop reason: HOSPADM

## 2024-06-21 RX ORDER — HYDROXYZINE HYDROCHLORIDE 25 MG/1
25-50 TABLET, FILM COATED ORAL EVERY 4 HOURS PRN
Qty: 30 TABLET | Refills: 0 | Status: SHIPPED | OUTPATIENT
Start: 2024-06-21

## 2024-06-21 RX ORDER — HYDROXYZINE PAMOATE 25 MG/1
25 CAPSULE ORAL 3 TIMES DAILY PRN
Status: ON HOLD | COMMUNITY
End: 2024-06-24

## 2024-06-21 RX ADMIN — ACETAMINOPHEN 650 MG: 325 TABLET ORAL at 10:07

## 2024-06-21 RX ADMIN — HYDROXYZINE HYDROCHLORIDE 50 MG: 25 TABLET ORAL at 22:23

## 2024-06-21 RX ADMIN — HYDROXYZINE HYDROCHLORIDE 50 MG: 25 TABLET ORAL at 14:18

## 2024-06-21 RX ADMIN — ESCITALOPRAM OXALATE 5 MG: 5 TABLET, FILM COATED ORAL at 12:48

## 2024-06-21 ASSESSMENT — ACTIVITIES OF DAILY LIVING (ADL)
ADLS_ACUITY_SCORE: 28

## 2024-06-21 NOTE — PLAN OF CARE
"  Rehab Group    Start time: 1015  End time: 1200  Patient time total: 60 minutes    attended partial group    #5 attended   Group Type: OT Clinic   Group Topic Covered: cognitive activities, coping skills, healthy leisure time, and problem solving   Group Session Detail: OT: Education on healthy activity engagement and creative hands-on endeavor (OT clinic) to increase concentration, focus, attention to task/detail, decision making, problem solving, frustration tolerance, task follow through, coping with stress, healthy leisure engagement, creative expression, and social engagement    Patient Response/Contribution:  cooperative with task, actively engaged, and Pleasant; Engaged; Restless   Patient Detail: Pt reported during check-in that \"taking my medicine\" is something that is going well for her recently; pt reported she has a fear of ingesting medications and was able to successfully take her medicine today. Pt sat among peers to complete OT questionnaire and engaged in reciprocal social interactions with peers. Pt pulled from group and returned for remainder. Pt engaged in a complex hands on creative endeavor for approximately 4-5+ minutes before becoming restless. Pt then became fixated on cleaning the paint out of the sink; pt engaged in task for remainder of group.      Train & Education Service Per Session 45 + Minutes () OT Group code    Patient Active Problem List   Diagnosis    OCHOA (generalized anxiety disorder)    PTSD (post-traumatic stress disorder)    Major depressive disorder, recurrent severe without psychotic features (H)         "

## 2024-06-21 NOTE — PROGRESS NOTES
"   06/20/24 2043   Patient Belongings   Patient Belongings locker   Patient Belongings Put in Hospital Secure Location (Security or Locker, etc.) cell phone/electronics;clothing;plastic bag;shoes   Belongings Search Yes   Clothing Search Yes   Second Staff Mihir     In locker: brown \"portals\" david, navy blue quarter zip, plaid pajama pants, QueaseEase aroma therapy, phone charging block and cable, iphone, snacks, water, nausea bag, tissues, pair of crocs with a duck.     Brought in 6/22/24  Blue tote bag  1 long sleeve  1 black t shirt  2 pairs of pajama pants  1 pair of leggings  1 pair of socks  4 pairs of underwear  1 brush  1 deodorant      No valuables sent to security.     .A               Admission:  I am responsible for any personal items that are not sent to the safe or pharmacy.  [facility] is not responsible for loss, theft or damage of any property in my possession.    Signature:  _________________________________ Date: _______  Time: _____                                              Staff Signature:  ____________________________ Date: ________  Time: _____      2nd Staff person, if patient is unable/unwilling to sign:    Signature: ________________________________ Date: ________  Time: _____     Discharge:  [facility] has returned all of my personal belongings:    Signature: _________________________________ Date: ________  Time: _____                                          Staff Signature:  ____________________________ Date: ________  Time: _____          "

## 2024-06-21 NOTE — PLAN OF CARE
Problem: Depressive Symptoms  Goal: Depressive Symptoms  Description: Signs and symptoms of listed problems will be absent or manageable.  Outcome: Progressing   Goal Outcome Evaluation:       Pt had no meds scheduled for 8:00. She rated anxiety 7/10 and refused interventions. She rated depression 1-2/10. She denied SI, HI, and hallucinations. She thought it was Thursday, and knew she is at [facility]. Pt mentioned that she is a picky eater and only ate the blueberry muffin of her breakfast. She used the telephone, and visible in the hallway. After 10:00pt had a 4/10 headache- given tylenol, within an hour pain 0/10.     Pt attended morning OT, a nutrition consult was placed, she went back on the phone, ate lunch, started Lexapro, and attended life skills class. After class pt claimed having a panic attack and wanted ativan. Pt given 50 mg hydroxyzine. Writer walked, talked, and sat with pt as the panic attack dissipated. She mentioned one trigger for her attacks are weekends.

## 2024-06-21 NOTE — PLAN OF CARE
"  Problem: Anxiety Signs/Symptoms  Goal: Optimized Energy Level (Anxiety Signs/Symptoms)  Outcome: Not Progressing  Problem: Adult Behavioral Health Plan of Care  Goal: Plan of Care Review  Outcome: Progressing  Goal: Optimized Coping Skills in Response to Life Stressors  Outcome: Progressing   Goal Outcome Evaluation:   Pt admitted from [facility] with dx of PTSD and anxiety. Pt has history of anxiety and panic attacks.  Legal- voluntary  Pt denies SI/SIB/hallucinations and depression. Pt endorses anxiety that she said\" it is worse, I usually experience panic attacks and that is why I am here.\"   Patient was very tearful during admission and when her dad visited.  PRN hydroxyzine was administered which was effective.  Pt contracts for safety.  Pt was given drawing papers and crayon as per her request, she spent time coloring in her room.  Pt does not like  taking  tablets because of previous bad experiences, hence she will prefer liquid medications or take one tablet at a time and wait before taking another.  Pt signed DEX for her dad.  No behavior noted. Staff will continue to monitor.    "

## 2024-06-21 NOTE — PLAN OF CARE
"Rehab Group     Start time: 1315  End time: 1415  Patient time total: 60 minutes     attended partial group     #5 attended   Group Type: occupational therapy   Group Topic Covered: cognitive activities, coping skills, and healthy leisure time   Group Session Detail: OT: Education on healthy leisure engagement and interactive social activity with a cognitive component (Tapple & Pictionary) to increase concentration, focus, attention to task/detail, memory recall, coping with stress, healthy distraction engagement, symptom management, healthy leisure engagement, social wellness, cognitive wellness, and abstract and concrete thinking    Patient Response/Contribution:  cooperative with task, actively engaged, and Pleasant; Engaged   Patient Detail: Pt reported during check-in that \"doing nails and evan\" are healthy leisure activities she enjoys and \"cheerleading and softball\" are healthy leisure activities she wants to engage in again. Pt sat among peers to complete novel social activity to support cognitive wellness and was able to follow verbal directions and visual demonstration for activity. Pt able to identify several accurate responses and provided responses to peer who was struggling to identify a response. Pt acted as both the presenter and guesser during activity. Pt reported they enjoyed the activities and verbalized understanding of importance of healthy leisure in a healthy lifestyle.       Train & Education Service Per Session 45 + Minutes () OT Group code     Patient Active Problem List   Diagnosis    OCHOA (generalized anxiety disorder)    PTSD (post-traumatic stress disorder)    Major depressive disorder, recurrent severe without psychotic features (H)         "

## 2024-06-21 NOTE — PLAN OF CARE
BEH IP Unit Acuity Rating Score (UARS)  Patient is given one point for every criteria they meet.    CRITERIA SCORING   On a 72 hour hold, court hold, committed, stay of commitment, or revocation. 0    Patient LOS on BEH unit exceeds 20 days. 0  LOS: 1   Patient under guardianship, 55+, otherwise medically complex, or under age 11. 0   Suicide ideation without relief of precipitating factors. 0   Current plan for suicide. 0   Current plan for homicide. 0   Imminent risk or actual attempt to seriously harm another without relief of factors precipitating the attempt. 0   Severe dysfunction in daily living (ex: complete neglect for self care, extreme disruption in vegetative function, extreme deterioration in social interactions). 1   Recent (last 7 days) or current physical aggression in the ED or on unit. 0   Restraints or seclusion episode in past 72 hours. 0   Recent (last 7 days) or current verbal aggression, agitation, yelling, etc., while in the ED or unit. 0   Active psychosis. 0   Need for constant or near constant redirection (from leaving, from others, etc).  0   Intrusive or disruptive behaviors. 0   Patient requires 3 or more hours of individualized nursing care per 8-hour shift (i.e. for ADLs, meds, therapeutic interventions). 0   TOTAL 1

## 2024-06-21 NOTE — PROGRESS NOTES
"CLINICAL NUTRITION SERVICES - ASSESSMENT NOTE     Nutrition Prescription    RECOMMENDATIONS FOR MDs/PROVIDERS TO ORDER:  Consider ED assessment for pt d/t significant sensory and texture issues, specific food preferences and unintentional weight loss. Pt would prefer referral for outpatient assessment if it would delay discharge.    Malnutrition Status:    Patient does not meet two of the established criteria necessary for diagnosing malnutrition but is at risk for malnutrition    Recommendations already ordered by Registered Dietitian (RD):  -Write-ins: chicken nuggets, grilled cheese, cheese pizza, mac + cheese  -String cheese @ 10am    Future/Additional Recommendations:  Monitor PO intake, preferences, wt trends.      REASON FOR ASSESSMENT  Janice Cox is a/an 18 year old female assessed by the dietitian for Provider Order - pt thinks she has ARFID, no formal dx.     CLINICAL HISTORY  PMH significant for PTSD, anxiety and panic attacks. Admitted from [facility] ED 6/20/24 due to concern for worsening anxiety and panic attacks.     NUTRITION HISTORY  RD spoke with Janice over the phone who reports that since she was 2 years old, she hasn't been able to eat \"normal\" food. Won't eat meat, applesauce, fruit or vegetables. Very particular about textures and gags if she tries to force herself to eat. Pt will starve vs forcing herself to eat. Pt typically eats the same foods everyday.  Safe foods include noodles, potatoes, corn, chicken, ketchup or buffalo sauce. Pt is agreeable to write-in options and scheduled snack, and recommendation for eating disorder assessment to rule out ARFID.     GI: Pt denied N/V/D/C     CURRENT NUTRITION ORDERS  Diet: Regular  Supplement: none  Intake/Tolerance: ate only blueberry muffin for breakfast    LABS  Reviewed    MEDICATIONS  Lexapro    ANTHROPOMETRICS  Height: 152.4 cm (5')  Most Recent Weight: 44.6 kg (98 lb 6.4 oz)    IBW: 45.5 kg   BMI: Normal BMI  Weight History:   Wt Readings " from Last 15 Encounters:   06/20/24 44.6 kg (98 lb 6.4 oz) (3%, Z= -1.96)*   06/19/24 44.4 kg (97 lb 14.2 oz) (2%, Z= -2.00)*   09/29/23 53.3 kg (117 lb 8 oz)      03/08/23 48 kg (105 lb 14.4 oz)        * Growth percentiles are based on CDC (Girls, 2-20 Years) data.   16.3% unintentional weight loss in 9 months  UBW: 105-110 lb, wt tends to fluctuate per pt    Dosing Weight: 44 kg (actual)    ASSESSED NUTRITION NEEDS  Estimated Energy Needs: 1100 - 1320 kcals/day (25 - 30 kcals/kg)  Justification: Maintenance  Estimated Protein Needs: 35 - 44 grams protein/day (0.8 - 1 grams of pro/kg)  Justification: Maintenance  Estimated Fluid Needs: 1 mL/kcal  Justification: Maintenance or Per Provider    PHYSICAL FINDINGS  See malnutrition section below.  No abnormal nutrition-related physical findings observed.     MALNUTRITION  % Intake: Decreased intake does not meet criteria  % Weight Loss: Weight loss does not meet criteria  Subcutaneous Fat Loss: Unable to assess - RD offsite  Muscle Loss: Unable to assess - RD offsite  Fluid Accumulation/Edema: Unable to assess - RD offsite  Malnutrition Diagnosis: Patient does not meet two of the established criteria necessary for diagnosing malnutrition but is at risk for malnutrition    NUTRITION DIAGNOSIS  Inadequate oral intake related to specific food preferences and sensory issues as evidenced by minimal intake since admission and 16% wt loss in 9 months.       INTERVENTIONS  Implementation  Nutrition Education: RD role in care   Collaboration with other providers  Modify composition of meals/snacks     Goals  Patient to consume % of nutritionally adequate meal trays TID, or the equivalent with supplements/snacks.     Monitoring/Evaluation  Progress toward goals will be monitored and evaluated per protocol.    Dolores Haynes, MPH, RDN, LD  Behavioral Health Adult & Pediatric Dietitian  BEH Clinical Dietitian [place], Teams, or Desk: [phone]  Weekend/Holiday Vocera: Weekend  Holiday Clinical Dietitian [Multi Site Groups]

## 2024-06-21 NOTE — PHARMACY-ADMISSION MEDICATION HISTORY
Pharmacist Admission Medication History    Admission medication history is complete. The information provided in this note is only as accurate as the sources available at the time of the update.    Information Source(s): Patient and CareEverywhere/SureScripts via phone    Pertinent Information:   Patient was very familiar with her medications.   Duloxetine filled on 5/22/24, however she reports that she stopped taking this.  Spironolactone was filled on 5/3/24 however she stopped this a couple weeks ago as she did not like the way it made her feel. Reports feeling dizzy and having some vertigo.     Changes made to PTA medication list:  Added:   Hydroxyzine 25 mg three times daily as needed  Ondansetron 4 mg every 8 hours as needed   Adapalene 0.3% external gel at bedtime as needed (patient reports only using this once a month)  Triamcinolone 0.1% cream 2 times daily as needed (patient reports only using when she has a flare)  Deleted: None  Changed: None    Allergies reviewed with patient and updates made in EHR: yes    Medication History Completed By: Luna AlbrechtD 6/21/2024 5:41 PM    Prior to Admission medications    Medication Sig Last Dose Taking? Auth Provider Long Term End Date   adapalene (DIFFERIN) 0.3 % external gel Apply topically nightly as needed Past Month Yes Unknown, Entered By History     hydrOXYzine leslie (VISTARIL) 25 MG capsule Take 25 mg by mouth 3 times daily as needed for anxiety PRN at PRN Yes Unknown, Entered By History     ondansetron (ZOFRAN ODT) 4 MG ODT tab Take 4 mg by mouth every 8 hours as needed for nausea PRN at PRN Yes Unknown, Entered By History     triamcinolone (KENALOG) 0.1 % external cream Apply topically 2 times daily as needed for irritation PRN at PRN Yes Unknown, Entered By History

## 2024-06-21 NOTE — PLAN OF CARE
Problem: Sleep Disturbance  Goal: Adequate Sleep/Rest  Outcome: Progressing  Goal Outcome Evaluation:  Patient in assigned room throughout the night shift and appeared to be comfortably asleep. No PRN medications requested or administered. No additional issues or concerns reported or observed. Safety checks completed at least every 15 minutes.   Sleep hours - 6.75.  Nursing will continue to monitor.
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
## 2024-06-21 NOTE — PROGRESS NOTES
06/21/24 1417   Individualization/Patient Specific Goals   Patient Vulnerabilities adverse childhood experience(s);lacks insight into illness;substance abuse/addiction   Interprofessional Rounds   Summary There was discussion about pt's reason for admission   Participants advanced practice nurse;nursing;CTC;OT   Behavioral Team Discussion   Participants Ghislaine Rubio APRN; Shiraz Recinos RN; Shraddha BARTON; Mely LEMA   Progress Minimal Recent admit   Anticipated length of stay 3 to 5 days   Continued Stay Criteria/Rationale Increased anxiety that impacts pt's ability to function from day to day   Medical/Physical See H&P   Plan Stablize on medications and discharge with increased outpatient supports   Rationale for change in precautions or plan Intial plan   Safety Plan Safe secure milieu   Anticipated Discharge Disposition home with family     PRECAUTIONS AND SAFETY    Behavioral Orders   Procedures    Code 1 - Restrict to Unit    Routine Programming     As clinically indicated    Status 15     Every 15 minutes.    Suicide precautions: Suicide Risk: LOW; Clinical rationale to override score: modification to the care environment     Patients on Suicide Precautions should have a Combination Diet ordered that includes a Diet selection(s) AND a Behavioral Tray selection for Safe Tray - with utensils, or Safe Tray - NO utensils       Order Specific Question:   Suicide Risk     Answer:   LOW     Order Specific Question:   Clinical rationale to override score:     Answer:   modification to the care environment       Safety  Safety WDL: WDL  Patient Location: patient room, own  Safety Measures: safety rounds completed
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
## 2024-06-21 NOTE — PLAN OF CARE
Problem: Depressive Symptoms  Goal: Depressive Symptoms  Description: Signs and symptoms of listed problems will be absent or manageable.  Outcome: Not Progressing   Goal Outcome Evaluation:       ADMIT: this  pt was admitted  to [facility] arriving at 1910 from [facility].  Pt was highly anxious to be here. She also states she doesn't like meds.  Her dad showed up to visit so we will allow to  visit dad at this moment.  Will continue to assess.

## 2024-06-22 PROCEDURE — 90853 GROUP PSYCHOTHERAPY: CPT

## 2024-06-22 PROCEDURE — 128N000002 HC R&B CD/MH ADOLESCENT

## 2024-06-22 PROCEDURE — 250N000013 HC RX MED GY IP 250 OP 250 PS 637: Performed by: CLINICAL NURSE SPECIALIST

## 2024-06-22 RX ADMIN — ESCITALOPRAM OXALATE 5 MG: 5 TABLET, FILM COATED ORAL at 08:32

## 2024-06-22 RX ADMIN — HYDROXYZINE HYDROCHLORIDE 25 MG: 25 TABLET ORAL at 10:44

## 2024-06-22 RX ADMIN — OLANZAPINE 5 MG: 2.5 TABLET, FILM COATED ORAL at 10:44

## 2024-06-22 ASSESSMENT — ACTIVITIES OF DAILY LIVING (ADL)
ADLS_ACUITY_SCORE: 28
DRESS: SCRUBS (BEHAVIORAL HEALTH)
ADLS_ACUITY_SCORE: 28
HYGIENE/GROOMING: INDEPENDENT
ADLS_ACUITY_SCORE: 28
LAUNDRY: UNABLE TO COMPLETE
ADLS_ACUITY_SCORE: 28
ADLS_ACUITY_SCORE: 28
ORAL_HYGIENE: INDEPENDENT
ADLS_ACUITY_SCORE: 28

## 2024-06-22 NOTE — PLAN OF CARE
"  Problem: Depressive Symptoms  Goal: Depressive Symptoms  Description: Signs and symptoms of listed problems will be absent or manageable.  Outcome: Progressing  Flowsheets (Taken 6/22/2024 9501)  Depressive Symptoms Assessed:   insight   suicidality   self injury   affect   sleep   thought process   mood   orientation   anxiety   speech  Depressive Symptoms Present:   anxiety   mood   Goal Outcome Evaluation:  Pt was visible in the milieu this shift. Presents with a bright affect, social with peers and staff.Pt denies SI/SIB. Pt endorsed anxiety, reported \"Anxiety is making me feel like throwing up\" Pt encouraged to use coping skills like breathing.Pt's vitals were within normal limits.  PRN Zyprexa and Hydroxyzine administered per pt's request.   Pt ate breakfast and lunch, reported good appetite. Will continue to monitor.                        "

## 2024-06-22 NOTE — PLAN OF CARE
"  Problem: Anxiety Signs/Symptoms  Goal: Optimized Energy Level (Anxiety Signs/Symptoms)  Outcome: Progressing   Goal Outcome Evaluation:         Received pt sleeping at the start of the shift.  Pt woke up for dinner and ate in lounge. Pt was visible and social with staff and peers. Pt had a bright affect on approach. Pt c/o of foot pain from hospital sandals and requesting her own shoes, ok to wait to talk with treatment team. Pt was calm and cooperative. Pt showered this evening. Pt still endorses some anxiety. However, pt stated her anxiety has been \"much better\" this evening. Pt denies other mental health symptoms. No behavioral or safety concerns. Will continue to monitor.               "

## 2024-06-22 NOTE — PLAN OF CARE
Problem: Sleep Disturbance  Goal: Adequate Sleep/Rest  Outcome: Progressing   Goal Outcome Evaluation:  Focus: Shift summary    Data: Patient slept 7 hours last night. No interventions needed. Respirations even and unlabored on status 15 checks. Will continue to monitor and report to oncoming staff.    Response: Report sleep hours to day shift. Continue to monitor patient and provide therapeutic interventions as necessary.

## 2024-06-23 PROCEDURE — 250N000013 HC RX MED GY IP 250 OP 250 PS 637: Performed by: CLINICAL NURSE SPECIALIST

## 2024-06-23 PROCEDURE — 128N000002 HC R&B CD/MH ADOLESCENT

## 2024-06-23 RX ADMIN — ESCITALOPRAM OXALATE 5 MG: 5 TABLET, FILM COATED ORAL at 08:17

## 2024-06-23 ASSESSMENT — ACTIVITIES OF DAILY LIVING (ADL)
ADLS_ACUITY_SCORE: 28

## 2024-06-23 NOTE — PLAN OF CARE
Goal Outcome Evaluation:         Before and after laying down to sleep for the night, pt was observed breathing normally, with chest rises noted during safety rounds all shift. Pt appears to have slept for a total of 6.75 hrs.

## 2024-06-23 NOTE — PLAN OF CARE
"Problem: Anxiety Signs/Symptoms  Goal: Optimized Energy Level (Anxiety Signs/Symptoms)  Outcome: Progressing   Goal Outcome Evaluation:  Pt was visible in the milieu engaged in independent activities of doing puzzles. Mood was pleasant with a bright affect on approach. She denied all mental health symptoms stating that her day was going on \"pretty smooth.\" She also denied any physical pain. Vital signs were stable. She was social and interactive with peers and watched TV too. Ate 100% off her meals trays with good fluids intake. Pt took a shower......appeared clean and well kept. She was medication compliant, denied any medication side effects. No prns given and no new behavior concerns noted or reported.    /79 (BP Location: Right arm, Patient Position: Sitting, Cuff Size: Adult Regular)   Pulse 58   Temp 98.1  F (36.7  C) (Tympanic)   Resp 18   Wt 44.8 kg (98 lb 12.8 oz)   LMP 06/17/2024   SpO2 100%   BMI 19.30 kg/m      "

## 2024-06-23 NOTE — PLAN OF CARE
Problem: Sleep Disturbance  Goal: Adequate Sleep/Rest  Outcome: Progressing   Goal Outcome Evaluation:  Focus: Shift summary    Data: Patient slept 6.75 hours last night. No interventions needed. Respirations even and unlabored on status 15 checks. Will continue to monitor and report to oncoming staff.    Response: Report sleep hours to day shift. Continue to monitor patient and provide therapeutic interventions as necessary.
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
## 2024-06-24 VITALS
WEIGHT: 98.8 LBS | OXYGEN SATURATION: 96 % | HEART RATE: 70 BPM | TEMPERATURE: 97.5 F | BODY MASS INDEX: 19.3 KG/M2 | DIASTOLIC BLOOD PRESSURE: 73 MMHG | SYSTOLIC BLOOD PRESSURE: 111 MMHG | RESPIRATION RATE: 18 BRPM

## 2024-06-24 PROCEDURE — 250N000013 HC RX MED GY IP 250 OP 250 PS 637: Performed by: CLINICAL NURSE SPECIALIST

## 2024-06-24 PROCEDURE — 99238 HOSP IP/OBS DSCHRG MGMT 30/<: CPT | Performed by: REGISTERED NURSE

## 2024-06-24 PROCEDURE — 90853 GROUP PSYCHOTHERAPY: CPT

## 2024-06-24 PROCEDURE — G0177 OPPS/PHP; TRAIN & EDUC SERV: HCPCS

## 2024-06-24 RX ADMIN — ESCITALOPRAM OXALATE 5 MG: 5 TABLET, FILM COATED ORAL at 08:29

## 2024-06-24 RX ADMIN — HYDROXYZINE HYDROCHLORIDE 25 MG: 25 TABLET ORAL at 00:04

## 2024-06-24 ASSESSMENT — ACTIVITIES OF DAILY LIVING (ADL)
ADLS_ACUITY_SCORE: 28
ADLS_ACUITY_SCORE: 28
HYGIENE/GROOMING: INDEPENDENT
ADLS_ACUITY_SCORE: 28
ORAL_HYGIENE: INDEPENDENT
ADLS_ACUITY_SCORE: 28
DRESS: SCRUBS (BEHAVIORAL HEALTH)

## 2024-06-24 NOTE — PLAN OF CARE
Initial meeting note:    Therapist introduced self to patient and offered assistance with completion of safety plan.     Pt response: Patient agreed to meet and safety plan was completed.    Plan: Patient will be discharging today.

## 2024-06-24 NOTE — PLAN OF CARE
06/23/24 2200   General Information   Art Directive other (see comments)     Goal Outcome Evaluation:      Rehab Group    Start time: 2000  End time: 2100  Patient time total: 45 minutes    attended full group    #5 attended   Group Type: art   Group Topic Covered: mindfulness       Group Session Detail:  Pt created a mindfulness focused watercolor painting in response to a chosen prompt from a handout of strength based themes.  Pt created an abstract painting symbolizing pts negative thoughts, which pt said is one thing she is working on during this admission.   Patient Response/Contribution:  cooperative with task       Patient Detail:    Pts mood was calm, pleasant participant.        Group Therapy (19772)    Patient Active Problem List   Diagnosis    OCHOA (generalized anxiety disorder)    PTSD (post-traumatic stress disorder)    Major depressive disorder, recurrent severe without psychotic features (H)

## 2024-06-24 NOTE — PROGRESS NOTES
Pt discharge @1640 picked up by father.  Discharge teaching, including a review of upcomming appointments and medication teaching, complete. Pt denies SI/SIB/HI. All belongings were returned to pt upon discharge.

## 2024-06-24 NOTE — PLAN OF CARE
Rehab Group    Start time: 1020  End time: 1200  Patient time total: 60 minutes    attended partial group    #8 attended   Group Type: occupational therapy   Group Topic Covered: balanced lifestyle, cognitive activities, coping skills, healthy leisure time, problem solving, and social skills     Group Session Detail:  OT CLINIC      Patient Response/Contribution:  organized, socially appropriate, and actively engaged       Patient Detail:    Occupational therapy clinic to facilitate coping skill exploration, creative expression within personally meaningful activities, and clinical observation of social, cognitive, and kinesthetic performance skills. Pt response: Presented with bright, engaged affect. IND to initiate, gather materials, sequence, and adjust to workspace demands as needed. IND With all performance skills. Demonstrated good focus, planning, and problem solving for selected task.           Train & Education Service Per Session 45 + Minutes () OT Group code    Patient Active Problem List   Diagnosis    OCHOA (generalized anxiety disorder)    PTSD (post-traumatic stress disorder)    Major depressive disorder, recurrent severe without psychotic features (H)

## 2024-06-24 NOTE — PLAN OF CARE
Group Attendance:  attended full group    Time session began: 1415  Time session ended: 1500  Patient's total time in group: 45    Total # Attendees   6   Group Type psychotherapeutic     Group Topic Covered insight improvement  Describe: put words on      Group Session Detail Patients engaged in a cooperative game involving answering emotions reflection questions.       Patient's response to the group topic/interactions:  positive affect, cooperative with task, and organized     Patient Details: Patient was a leader in getting the game put together and getting things started and was an active an appropriate participant.  Showed respect to peers and some insight with her responses.           10228 - Group psychotherapy - 1 Session    Patient Active Problem List   Diagnosis    OCHOA (generalized anxiety disorder)    PTSD (post-traumatic stress disorder)    Major depressive disorder, recurrent severe without psychotic features (H)

## 2024-06-24 NOTE — PLAN OF CARE
Team Note Due:  Friday    Assessment/Intervention/Current Symtoms and Care Coordination:  Chart review and met with team, discussed pt progress, symptomology, and response to treatment.  Discussed the discharge plan and any potential impediments to discharge. Patient expressed reduced symptoms anxiety and depression. Patient is social in the milieu.        Discharge Plan or Goal:  Home with outpatient supports      Barriers to Discharge:  Symptom and treatment stabilization      Referral Status:  Pending with Care Coordinators     Legal Status:  Voluntary   County:   File Number:   Start and expiration date of commitment:     Contacts:  Nabil Cox (Father) [phone]      Upcoming Meetings and Dates/Important Information and next steps:  Discharge

## 2024-06-24 NOTE — PLAN OF CARE
Problem: Anxiety Signs/Symptoms  Goal: Optimized Energy Level (Anxiety Signs/Symptoms)  Outcome: Progressing   Goal Outcome Evaluation:     Plan of Care Reviewed With: patient       Pt med compliant, claims no pain. She rated anxiety 1-2/10 and refused interventions. She denied depression, SI, HI, and hallucinations. Pt ate breakfast, visible in lounge, attended morning OT groups.     Pt ate lunch, attended life skills class, and process group. Pt is expected to discharge at 17:00.

## 2024-06-24 NOTE — PLAN OF CARE
Goal Outcome Evaluation:      Plan of Care Reviewed With: patient        Patient slept for 5.25  hours. PRN Hydroxyzine 25 mg given at 0004 hrs per pt request with effective results. Respirations even and unlabored on status 15 checks. Continue with plan of care.

## 2024-06-24 NOTE — DISCHARGE INSTRUCTIONS
Behavioral Discharge Planning and Instructions    Summary: You were admitted on 6/20/2024  due to Anxiety and Panic Attacks.  You were treated by LEXI Hare CNP and Debra Naegele, APRN,CNP and discharged on 6/24/24 from [facility] Adult to Home    Main Diagnosis: General anxiety disorder with panic attacks, Adjustment disorder with depression and anxiety.     Health Care Follow-up:     Appointment: DBT Intake/ Screening  Date/time:Thursday June 27th, 2024 @ 4:00 PM  Virtual  Provider:  Terra Deleon LP  Address: [address] [address]  Phone: [phone]  Fax: [phone]   Note:  These are a 60 minute appointments. Intake paperwork to be completed beforehand and visit link will be sent to [email or web address] .   Secondary Intake:  Tuesday July 2nd, 2024 @ 2:00 PM  Virtual   ** HUC to fax AVS upon discharge, please.     Dayami Ramos MD with [facility]-In Office Appointment July 10, 2024 @4 pm  [address] [place] [address]  Phone: [phone] Fax: [phone]            Information will be faxed to your outpatient providers to ensure a healthy continuity of care for you.     Attend all scheduled appointments with your outpatient providers. Call at least 24 hours in advance if you need to reschedule an appointment to ensure continued access to your outpatient providers.     Major Treatments, Procedures and Findings:  You were provided with: a psychiatric assessment, assessed for medical stability, medication evaluation and/or management, group therapy, individual therapy, and milieu management    Symptoms to Report: feeling more aggressive, increased confusion, losing more sleep, mood getting worse, or thoughts of suicide    Early warning signs can include: increased depression or anxiety sleep disturbances increased thoughts or behaviors of suicide or self-harm  increased unusual thinking, such as paranoia or hearing  voices    Safety and Wellness:  Take all medicines as directed.  Make no changes unless your doctor suggests them. Follow treatment recommendations.  Refrain from alcohol and non-prescribed drugs.  If there is a concern for safety, call 911.    Resources:   Mental Health Crisis Resources  Throughout [place]: call **CRISIS (**171913)  Crisis Text Line: is available for free, 24/7 by texting [place] to 001995  Suicide Awareness Voices of Education (SAVE) (www.save.org): 184-465-ASDM (6711)  The National Suicide Prevention Lifeline is now: 988 Suicide and Crisis Lifeline. Call 988 anytime.  National [place] on Mental Illness (www.[place].ranjan.org): 575.954.1895 or 895-784-9968.  Gplu2yrzm: text the word LIFE to 33418 for immediate support and crisis intervention  Mental Health Consumer/Survivor Network of [place] (www.mhcsn.net): 550.520.5074 or 760-128-9158  Mental Health Association of [place] (www.mentalhealth.org): 227.951.9418 or 205-905-8531  Peer Support Connection [place] Warmline (PSC) 1-102.545.1003 Available from 5pm - 9am (7 days a week/365 days a year)  [facility] 1-728.611.8338 or [facility] 510-098-5528      General Medication Instructions:   See your medication sheet(s) for instructions.   Take all medicines as directed.  Make no changes unless your doctor suggests them.   Go to all your doctor visits.  Be sure to have all your required lab tests. This way, your medicines can be refilled on time.  Do not use any drugs not prescribed by your doctor.  Avoid alcohol.    Advance Directives:   Scanned document on file with [facility]? No scanned doc  Is document scanned? No. Copy Requested.  Honoring Choices Your Rights Handout: Informed and given  Was more information offered? Pt declined    The Treatment team has appreciated the opportunity to work with you. If you have any questions or concerns about your recent admission, you can contact the unit which can receive your call 24 hours a day, 7 days a week. They will be able  to get in touch with a Provider if needed. The unit number is [phone] .

## 2024-06-24 NOTE — PLAN OF CARE
Care Coordinator Note(s):    Care Request(s):   Day Treatment  Preferences: Time Frame: 1 Week, Any Appointment Type (In Person, Virtual, Telephone), Female Provider  Notes: Near [place]/Remote if further out    [email or web address]     Care Outcome(s):    CC Progress Note(s)/ Documentation:     [facility] [place]  In person clinics too far.   [facility]  Phone: [phone]  Fax: [phone]          Appointment: DBT Intake/ Screening  Date/time:Thursday June 27th, 2024 @ 4:00 PM  Virtual  Provider:  Terra Deleon LP  Address: [place] [address]  Phone: [phone]  Fax: [phone]   Note:  These are a 60 minute appointments. Intake paperwork to be completed beforehand and visit link will be sent to [email or web address] .   Secondary Intake:  Tuesday July 2nd, 2024 @ 2:00 PM  [place]      -Dayami Lebron  Adult Behavioral Health Care Coordinator

## 2024-06-24 NOTE — PLAN OF CARE
"  Rehab Group    Start time: 1315  End time: 1400  Patient time total: 45 minutes    attended full group    #6 attended   Group Type: occupational therapy   Group Topic Covered: balanced lifestyle       Group Session Detail:  Life Skills Time Management     Patient Response/Contribution:  cooperative with task, organized, and actively engaged       Patient Detail:    General health and coping: Discussion and hands-on activity focused on identifying current struggles with time management and how that relates to mental health symptoms. The focus was also on possible skills and strategies one might use to improve time management skills. Pt Response: Pt was an active participant in both task and discussive components of group. Pt contributed to the group discussion. IND to complete the hands-on activity: creating and laminating a white board daily planner to encourage goal-setting, routine, and time management. Pt identified one time management strategy they would like to implement into their routine: be willing to sacrifice \"perfection\" to get things done.          Train & Education Service Per Session 45 + Minutes () OT Group code    Patient Active Problem List   Diagnosis    OCHOA (generalized anxiety disorder)    PTSD (post-traumatic stress disorder)    Major depressive disorder, recurrent severe without psychotic features (H)       "
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
## 2024-06-25 ENCOUNTER — PATIENT OUTREACH (OUTPATIENT)
Dept: CARE COORDINATION | Facility: CLINIC | Age: 19
End: 2024-06-25
Payer: COMMERCIAL

## 2024-06-25 NOTE — PROGRESS NOTES
[facility]: Transitions of Care Outreach  Chief Complaint   Patient presents with    Clinic Care Coordination - Post Hospital       Most Recent Admission Date: 6/20/2024   Most Recent Admission Diagnosis:      Most Recent Discharge Date: 6/24/2024   Most Recent Discharge Diagnosis: OCHOA (generalized anxiety disorder) - F41.1  Nausea - R11.0     Transitions of Care Assessment    Discharge Assessment  How are you doing now that you are home?: Today SW spoke to patient who reports she is doing well. She states that she feels calm without panic. She is taking her meds, no questions or concerns.  How are your symptoms? (Red Flag symptoms escalate to triage hotline per guidelines): Improved  Do you know how to contact your clinic care team if you have future questions or changes to your health status? : Yes  Does the patient have their discharge instructions? : Yes  Does the patient have questions regarding their discharge instructions? : No  Were you started on any new medications or were there changes to any of your previous medications? : Yes  Does the patient have all of their medications?: Yes  Do you have questions regarding any of your medications? : No  Do you have all of your needed medical supplies or equipment (DME)?  (i.e. oxygen tank, CPAP, cane, etc.): Yes           Follow up Plan   Behavioral Discharge Planning and Instructions  Summary: You were admitted on 6/20/2024 due to Anxiety and Panic Attacks. You were treated by LEXI Hare,EBENEZER and Debra Naegele, APRN,CNP and discharged on 6/24/24 from Young Adult to Home  Main Diagnosis: General anxiety disorder with panic attacks, Adjustment disorder with depression and anxiety.  Health Care Follow-up:  Appointment: DBT Intake/ Screening  Date/time:Thursday June 27th, 2024 @ 4:00 PM Virtual  Provider: Terra Deleon LP  Address: [facility] [address] [address] [place] [address]  Phone:  [phone]  Fax: [phone]  Note: These are a 60 minute appointments. Intake paperwork to be completed beforehand and visit link will be sent to  [email or web address] .  Secondary Intake: Tuesday July 2nd, 2024 @ 2:00 PM Virtual  ** HUC to fax AVS upon discharge, please.  Dayami Ramos MD with [facility]-In Office Appointment July 10, 2024 @4 pm  [address] [place] [address]  Phone: [phone] Fax: [phone]  Discharge Follow-Up  Discharge follow up appointment scheduled in alignment with recommended follow up timeframe or Transitions of Risk Category? (Low = within 30 days; Moderate= within 14 days; High= within 7 days): Yes  Discharge Follow Up Appointment Date: 06/27/24  Discharge Follow Up Appointment Scheduled with?: Specialty Care Provider (DBT)    No future appointments.    Outpatient Plan as outlined on AVS reviewed with patient.    For any urgent concerns, please contact our 24 hour nurse triage line: [phone] ([phone])       BRENDA Dobson (she/her/hers)  Social Work Clinic Care Coordinator   [facility]  [email or web address]  [phone]